# Patient Record
Sex: MALE | Race: WHITE
[De-identification: names, ages, dates, MRNs, and addresses within clinical notes are randomized per-mention and may not be internally consistent; named-entity substitution may affect disease eponyms.]

---

## 2018-08-15 ENCOUNTER — HOSPITAL ENCOUNTER (OUTPATIENT)
Dept: HOSPITAL 25 - OREAST | Age: 78
Discharge: HOME | End: 2018-08-15
Attending: SPECIALIST
Payer: MEDICARE

## 2018-08-15 VITALS — SYSTOLIC BLOOD PRESSURE: 116 MMHG | DIASTOLIC BLOOD PRESSURE: 63 MMHG

## 2018-08-15 DIAGNOSIS — M19.90: ICD-10-CM

## 2018-08-15 DIAGNOSIS — H40.1111: Primary | ICD-10-CM

## 2018-08-15 DIAGNOSIS — Z72.0: ICD-10-CM

## 2018-08-15 PROCEDURE — C1725 CATH, TRANSLUMIN NON-LASER: HCPCS

## 2018-08-15 NOTE — OP
OPERATIVE REPORT:

 

DATE OF OPERATION:

 

DATE OF BIRTH:  [birth year]

 

SURGEON:  Maged Montes De Oca MD

 

ANESTHESIA:  Local MAC.

 

PRE-OP DIAGNOSIS:  Primary open angle glaucoma, right eye.

 

POST-OP DIAGNOSIS:  Primary open angle glaucoma, right eye.

 

OPERATIVE PROCEDURE:  Insertion of Xen Gel stent right eye.

 

COMPLICATIONS:  None.

 

DESCRIPTION OF PROCEDURE:  The patient was given topical 2% lidocaine with epinephrine topical.  The 
Xen implant was checked and marked with a marking pen. Paracentesis made with a 20-gauge keratome at 
the 10 o'clock position.  Anterior chamber irrigated with 1% non-preserved intracameral lidocaine yenny
led with Healon. A 1.8 mm clear corneal incision was made at the 7 o'clock position.  3 mm posterior 
to the limbus was marked with a sterile marking pen at 12 o'clock.  Mitomycin-C 0.2 mg/mL infused sub
tenon in that area.  The Xen implant was placed at the 1 o'clock position without difficulty.  Healon
 irrigated out of the eye.  Topical Maxitrol drops were given.

 

 [id number] #: [id number]